# Patient Record
Sex: FEMALE | Race: BLACK OR AFRICAN AMERICAN
[De-identification: names, ages, dates, MRNs, and addresses within clinical notes are randomized per-mention and may not be internally consistent; named-entity substitution may affect disease eponyms.]

---

## 2019-01-21 ENCOUNTER — HOSPITAL ENCOUNTER (EMERGENCY)
Dept: HOSPITAL 41 - JD.ED | Age: 54
Discharge: HOME | End: 2019-01-21
Payer: MEDICAID

## 2019-01-21 DIAGNOSIS — I10: ICD-10-CM

## 2019-01-21 DIAGNOSIS — Z79.899: ICD-10-CM

## 2019-01-21 DIAGNOSIS — G43.909: Primary | ICD-10-CM

## 2019-01-21 DIAGNOSIS — Z79.82: ICD-10-CM

## 2019-01-21 PROCEDURE — 83735 ASSAY OF MAGNESIUM: CPT

## 2019-01-21 PROCEDURE — 99285 EMERGENCY DEPT VISIT HI MDM: CPT

## 2019-01-21 PROCEDURE — 96376 TX/PRO/DX INJ SAME DRUG ADON: CPT

## 2019-01-21 PROCEDURE — 80306 DRUG TEST PRSMV INSTRMNT: CPT

## 2019-01-21 PROCEDURE — 84443 ASSAY THYROID STIM HORMONE: CPT

## 2019-01-21 PROCEDURE — 80053 COMPREHEN METABOLIC PANEL: CPT

## 2019-01-21 PROCEDURE — 85730 THROMBOPLASTIN TIME PARTIAL: CPT

## 2019-01-21 PROCEDURE — 85007 BL SMEAR W/DIFF WBC COUNT: CPT

## 2019-01-21 PROCEDURE — 71045 X-RAY EXAM CHEST 1 VIEW: CPT

## 2019-01-21 PROCEDURE — 36415 COLL VENOUS BLD VENIPUNCTURE: CPT

## 2019-01-21 PROCEDURE — 70450 CT HEAD/BRAIN W/O DYE: CPT

## 2019-01-21 PROCEDURE — 85027 COMPLETE CBC AUTOMATED: CPT

## 2019-01-21 PROCEDURE — 93005 ELECTROCARDIOGRAM TRACING: CPT

## 2019-01-21 PROCEDURE — 85610 PROTHROMBIN TIME: CPT

## 2019-01-21 PROCEDURE — 96374 THER/PROPH/DIAG INJ IV PUSH: CPT

## 2019-01-21 PROCEDURE — 81001 URINALYSIS AUTO W/SCOPE: CPT

## 2019-01-21 PROCEDURE — 96361 HYDRATE IV INFUSION ADD-ON: CPT

## 2019-01-21 PROCEDURE — 96375 TX/PRO/DX INJ NEW DRUG ADDON: CPT

## 2019-01-21 PROCEDURE — 82962 GLUCOSE BLOOD TEST: CPT

## 2019-01-21 NOTE — EDM.PDOC
ED HPI GENERAL MEDICAL PROBLEM





- General


Chief Complaint: Neuro Symptoms/Deficits


Stated Complaint: [facility]


Time Seen by Provider: 01/21/19 16:06


Source of Information: Reports: Patient, Police


History Limitations: Reports: No Limitations





- History of Present Illness


INITIAL COMMENTS - FREE TEXT/NARRATIVE: 





53-year-old female arrives via ambulance service for evaluation treatment of 

right-sided weakness.





Patient is currently at [facility].





The report I got from the police and she began complaining of dizziness around 

1400. They moved her downstairs for closer monitoring. They appreciated that 

she then had speech difficulties which they did not identify as slurred speech 

but more trouble with stuttering and word finding. Patient is complaining of 

right-sided weakness in her arm and leg. She is also complaining of numbness 

and tingling in the right side of her arm and leg. She reports that she has a 

history of an aneurysm, this has never ruptured and has not required any repair 

thus far. Patient reports whenever she coughs she experiences a headache. 

Associated symptoms currently include a headache, dizziness, nausea and 

vomiting. 





Patient reports she has a history of multiple stroke. This was in [place], [place].





EMS reports that she did walk a short distance in front of them. She had a 

shaky gait but did not demonstrate any obvious weakness, right side did not 

give out on her.





- Related Data


 Allergies











Allergy/AdvReac Type Severity Reaction Status Date / Time


 


No Known Allergies Allergy   Verified 01/21/19 16:03











Home Meds: 


 Home Meds





Aspirin [Radford Aspirin EC] 81 mg PO DAILY 01/21/19 [History]


Citalopram Hydrobromide [Celexa] 10 mg PO DAILY 01/21/19 [History]


Cyclobenzaprine [Flexeril] 10 mg PO TID PRN 01/21/19 [History]


Gabapentin [Neurontin] 300 mg PO BID 01/21/19 [History]


Metoprolol Tartrate 25 mg PO BID 01/21/19 [History]


Naproxen 375 mg PO BID 01/21/19 [History]


Omeprazole 40 mg PO DAILY 01/21/19 [History]


Polyethylene Glycol [Polyox Wsr-301] 1 dose PO DAILY PRN 01/21/19 [History]


Pravastatin [Pravachol] 40 mg PO DAILY 01/21/19 [History]


Thiamine [Vitamin B-1] 100 mg PO DAILY 01/21/19 [History]


amLODIPine Besylate [Amlodipine Besylate] 10 mg PO DAILY 01/21/19 [History]


hydrOXYzine HCl [hydrOXYzine] 25 mg PO BEDTIME PRN 01/21/19 [History]











Past Medical History


Cardiovascular History: Reports: Hypertension


Neurological History: Reports: Cerebral Aneurysms, CVA





Social & Family History





- Tobacco Use


Smoking Status *Q: Never Smoker


Second Hand Smoke Exposure: No





- Caffeine Use


Caffeine Use: Reports: None





- Recreational Drug Use


Recreational Drug Use: Yes


Drug Use in Last 12 Months: Yes





ED ROS GENERAL





- Review of Systems


Review Of Systems: See Below


Respiratory: Denies: Shortness of Breath


Cardiovascular: Denies: Chest Pain


GI/Abdominal: Reports: Nausea, Vomiting


Musculoskeletal: Reports: Neck Pain


Neurological: Reports: Dizziness, Headache (with cough), Numbness (right side), 

Tingling (right side), Trouble Speaking (no slurred speech, shaky speech, 

trouble wiht word finding), Difficulty Walking, Weakness (right sided)





ED EXAM, NEURO





- Physical Exam


Exam: See Below


Exam Limited By: No Limitations


General Appearance: Alert, WD/WN, No Apparent Distress


Eye Exam: Bilateral Eye: EOMI, Normal Inspection, PERRL


Ears: Normal External Exam


Nose: Normal Inspection


Throat/Mouth: Normal Inspection, Normal Lips, No Airway Compromise, Other (no 

slurred speech)


Respiratory/Chest: No Respiratory Distress, Lungs Clear, Normal Breath Sounds


Cardiovascular: Normal Peripheral Pulses, Regular Rate, Rhythm, No Murmur


GI/Abdominal: Soft, Non-Tender


Neurological: Alert, Normal Mood/Affect, Normal Dorsiflexion (poor effort ), 

Normal Plantar Flexion (poor effort), Other ( 4/5 bilaterally, questionable 

effort; no slurred speech, trouble wiht word finding and has shaky speech, no 

facial droop, equal sesation on both sides; no pronator drift to arms or legs 

has overall generalized weakness and cannot raise arms or legs on own, drops 

arms and legs immediately when raised for her. )


Extremities: Normal Inspection, Normal Capillary Refill


Psychiatric: Normal Affect, Normal Mood


Skin Exam: Warm, Dry, Normal Color





EKG INTERPRETATION


EKG Date: 01/21/19


Time: 17:00


Rhythm: NSR


Rate (Beats/Min): 105


Axis: Normal


P-Wave: Present


QRS: Normal


ST-T: Normal


QT: Normal


EKG Interpretation Comments: 





sinus tachycardia at 105. + MARCIO. + LAE. No AVB. No ischemic changes. Slight 

early transition. Borderline LAD. No LVH. No IVCD. QTc within normal limits 

with a QTc of 463. Reviewed by myself and Dr. Estrada.





Course





- Vital Signs


Last Recorded V/S: 


 Last Vital Signs











Temp  97.1 F   01/21/19 16:00


 


Pulse  108 H  01/21/19 16:00


 


Resp  18   01/21/19 16:00


 


BP  134/101 H  01/21/19 16:00


 


Pulse Ox  99   01/21/19 16:00














- Orders/Labs/Meds


Labs: 


 Laboratory Tests











  01/21/19 01/21/19 01/21/19 Range/Units





  16:30 16:30 16:30 


 


WBC  12.94 H    (3.98-10.04)  K/mm3


 


RBC  4.76    (3.98-5.22)  M/mm3


 


Hgb  14.2    (11.2-15.7)  gm/L


 


Hct  43.5    (34.1-44.9)  %


 


MCV  91.4    (79.4-94.8)  fl


 


MCH  29.8    (25.6-32.2)  pg


 


MCHC  32.6    (32.2-35.5)  g/dl


 


RDW Std Deviation  43.8    (36.4-46.3)  fL


 


Plt Count  283    (182-369)  K/mm3


 


MPV  9.5    (9.4-12.3)  fl


 


Neutrophils % (Manual)  61 H    (40-60)  %


 


Band Neutrophils %  2    (0-10)  %


 


Lymphocytes % (Manual)  32    (20-40)  %


 


Atypical Lymphs %  0    %


 


Monocytes % (Manual)  5    (2-10)  %


 


Eosinophils % (Manual)  0 L    (0.7-5.8)  %


 


Basophils % (Manual)  0 L    (0.1-1.2)  


 


Platelet Estimate  Adequate    


 


RBC Morph Comment  Normal    


 


PT   10.5   (9.5-12.1)  SECONDS


 


INR   0.96   


 


APTT   28   (24-31)  SECONDS


 


Sodium    141  (136-145)  mEq/L


 


Potassium    3.1 L  (3.5-5.1)  mEq/L


 


Chloride    101  ()  mEq/L


 


Carbon Dioxide    28  (21-32)  mEq/L


 


Anion Gap    15.1 H  (5-15)  


 


BUN    15  (7-18)  mg/dL


 


Creatinine    0.8  (0.55-1.02)  mg/dL


 


Est Cr Clr Drug Dosing    65.73  mL/min


 


Estimated GFR (MDRD)    > 60  (>60)  mL/min


 


BUN/Creatinine Ratio    18.8 H  (14-18)  


 


Glucose    114 H  ()  mg/dL


 


POC Glucose     ()  mg/dL


 


Calcium    10.2 H  (8.5-10.1)  mg/dL


 


Magnesium     (1.8-2.4)  mg/dl


 


Total Bilirubin    0.5  (0.2-1.0)  mg/dL


 


AST    18  (15-37)  U/L


 


ALT    31  (14-59)  U/L


 


Alkaline Phosphatase    94  ()  U/L


 


Total Protein    8.6 H  (6.4-8.2)  g/dl


 


Albumin    4.7  (3.4-5.0)  g/dl


 


Globulin    3.9  gm/dL


 


Albumin/Globulin Ratio    1.2  (1-2)  


 


TSH 3rd Generation     (0.358-3.74)  uIU/mL


 


Urine Color     (Yellow)  


 


Urine Appearance     (Clear)  


 


Urine pH     (5.0-8.0)  


 


Ur Specific Gravity     (1.005-1.030)  


 


Urine Protein     (Negative)  


 


Urine Glucose (UA)     (Negative)  


 


Urine Ketones     (Negative)  


 


Urine Occult Blood     (Negative)  


 


Urine Nitrite     (Negative)  


 


Urine Bilirubin     (Negative)  


 


Urine Urobilinogen     (0.2-1.0)  


 


Ur Leukocyte Esterase     (Negative)  


 


Urine RBC     (0-5)  /hpf


 


Urine WBC     (0-5)  /hpf


 


Ur Epithelial Cells     (0-5)  /hpf


 


Urine Bacteria     (FEW)  /hpf


 


Urine Mucus     (FEW)  /hpf


 


Urine Opiates Screen     (DOILDV=925)  


 


Ur Buprenorphine Scrn     (CUTOFF=10)  


 


Ur Oxycodone Screen     (WTL5ZG=031)  


 


Urine Methadone Screen     (WBUMNW=203)  


 


Ur Propoxyphene Screen     (MYEIYJ=645)  


 


Ur Barbiturates Screen     (EOYOPB=820)  


 


Ur Tricyclics Screen     (HJZBJJ=712)  


 


Ur Phencyclidine Scrn     (CUTOFF=25)  


 


Ur Amphetamine Screen     (TAXYAK=171)  


 


U Methamphetamines Scrn     (UQZIQO=040)  


 


U Benzodiazepines Scrn     (QGZXBL=038)  


 


U Cocaine Metab Screen     (JJPKRP=543)  


 


U Marijuana (THC) Screen     (CUTOFF=50)  














  01/21/19 01/21/19 01/21/19 Range/Units





  16:30 16:49 17:54 


 


WBC     (3.98-10.04)  K/mm3


 


RBC     (3.98-5.22)  M/mm3


 


Hgb     (11.2-15.7)  gm/L


 


Hct     (34.1-44.9)  %


 


MCV     (79.4-94.8)  fl


 


MCH     (25.6-32.2)  pg


 


MCHC     (32.2-35.5)  g/dl


 


RDW Std Deviation     (36.4-46.3)  fL


 


Plt Count     (182-369)  K/mm3


 


MPV     (9.4-12.3)  fl


 


Neutrophils % (Manual)     (40-60)  %


 


Band Neutrophils %     (0-10)  %


 


Lymphocytes % (Manual)     (20-40)  %


 


Atypical Lymphs %     %


 


Monocytes % (Manual)     (2-10)  %


 


Eosinophils % (Manual)     (0.7-5.8)  %


 


Basophils % (Manual)     (0.1-1.2)  


 


Platelet Estimate     


 


RBC Morph Comment     


 


PT     (9.5-12.1)  SECONDS


 


INR     


 


APTT     (24-31)  SECONDS


 


Sodium     (136-145)  mEq/L


 


Potassium     (3.5-5.1)  mEq/L


 


Chloride     ()  mEq/L


 


Carbon Dioxide     (21-32)  mEq/L


 


Anion Gap     (5-15)  


 


BUN     (7-18)  mg/dL


 


Creatinine     (0.55-1.02)  mg/dL


 


Est Cr Clr Drug Dosing     mL/min


 


Estimated GFR (MDRD)     (>60)  mL/min


 


BUN/Creatinine Ratio     (14-18)  


 


Glucose     ()  mg/dL


 


POC Glucose   111 H   ()  mg/dL


 


Calcium     (8.5-10.1)  mg/dL


 


Magnesium  2.2    (1.8-2.4)  mg/dl


 


Total Bilirubin     (0.2-1.0)  mg/dL


 


AST     (15-37)  U/L


 


ALT     (14-59)  U/L


 


Alkaline Phosphatase     ()  U/L


 


Total Protein     (6.4-8.2)  g/dl


 


Albumin     (3.4-5.0)  g/dl


 


Globulin     gm/dL


 


Albumin/Globulin Ratio     (1-2)  


 


TSH 3rd Generation  1.808    (0.358-3.74)  uIU/mL


 


Urine Color    Yellow  (Yellow)  


 


Urine Appearance    Clear  (Clear)  


 


Urine pH    7.0  (5.0-8.0)  


 


Ur Specific Gravity    1.020  (1.005-1.030)  


 


Urine Protein    1+ H  (Negative)  


 


Urine Glucose (UA)    Negative  (Negative)  


 


Urine Ketones    2+ H  (Negative)  


 


Urine Occult Blood    Negative  (Negative)  


 


Urine Nitrite    Negative  (Negative)  


 


Urine Bilirubin    Negative  (Negative)  


 


Urine Urobilinogen    2.0 H  (0.2-1.0)  


 


Ur Leukocyte Esterase    Negative  (Negative)  


 


Urine RBC    0-5  (0-5)  /hpf


 


Urine WBC    0-5  (0-5)  /hpf


 


Ur Epithelial Cells    5-10 H  (0-5)  /hpf


 


Urine Bacteria    Few  (FEW)  /hpf


 


Urine Mucus    Few  (FEW)  /hpf


 


Urine Opiates Screen     (QNRMUA=505)  


 


Ur Buprenorphine Scrn     (CUTOFF=10)  


 


Ur Oxycodone Screen     (XPS3OW=724)  


 


Urine Methadone Screen     (GGWUXC=274)  


 


Ur Propoxyphene Screen     (UDYBKL=279)  


 


Ur Barbiturates Screen     (MCBFTO=461)  


 


Ur Tricyclics Screen     (QANNFX=007)  


 


Ur Phencyclidine Scrn     (CUTOFF=25)  


 


Ur Amphetamine Screen     (IALTHA=820)  


 


U Methamphetamines Scrn     (NRLEXS=026)  


 


U Benzodiazepines Scrn     (UEURVX=328)  


 


U Cocaine Metab Screen     (FPCRFM=450)  


 


U Marijuana (THC) Screen     (CUTOFF=50)  














  01/21/19 Range/Units





  17:54 


 


WBC   (3.98-10.04)  K/mm3


 


RBC   (3.98-5.22)  M/mm3


 


Hgb   (11.2-15.7)  gm/L


 


Hct   (34.1-44.9)  %


 


MCV   (79.4-94.8)  fl


 


MCH   (25.6-32.2)  pg


 


MCHC   (32.2-35.5)  g/dl


 


RDW Std Deviation   (36.4-46.3)  fL


 


Plt Count   (182-369)  K/mm3


 


MPV   (9.4-12.3)  fl


 


Neutrophils % (Manual)   (40-60)  %


 


Band Neutrophils %   (0-10)  %


 


Lymphocytes % (Manual)   (20-40)  %


 


Atypical Lymphs %   %


 


Monocytes % (Manual)   (2-10)  %


 


Eosinophils % (Manual)   (0.7-5.8)  %


 


Basophils % (Manual)   (0.1-1.2)  


 


Platelet Estimate   


 


RBC Morph Comment   


 


PT   (9.5-12.1)  SECONDS


 


INR   


 


APTT   (24-31)  SECONDS


 


Sodium   (136-145)  mEq/L


 


Potassium   (3.5-5.1)  mEq/L


 


Chloride   ()  mEq/L


 


Carbon Dioxide   (21-32)  mEq/L


 


Anion Gap   (5-15)  


 


BUN   (7-18)  mg/dL


 


Creatinine   (0.55-1.02)  mg/dL


 


Est Cr Clr Drug Dosing   mL/min


 


Estimated GFR (MDRD)   (>60)  mL/min


 


BUN/Creatinine Ratio   (14-18)  


 


Glucose   ()  mg/dL


 


POC Glucose   ()  mg/dL


 


Calcium   (8.5-10.1)  mg/dL


 


Magnesium   (1.8-2.4)  mg/dl


 


Total Bilirubin   (0.2-1.0)  mg/dL


 


AST   (15-37)  U/L


 


ALT   (14-59)  U/L


 


Alkaline Phosphatase   ()  U/L


 


Total Protein   (6.4-8.2)  g/dl


 


Albumin   (3.4-5.0)  g/dl


 


Globulin   gm/dL


 


Albumin/Globulin Ratio   (1-2)  


 


TSH 3rd Generation   (0.358-3.74)  uIU/mL


 


Urine Color   (Yellow)  


 


Urine Appearance   (Clear)  


 


Urine pH   (5.0-8.0)  


 


Ur Specific Gravity   (1.005-1.030)  


 


Urine Protein   (Negative)  


 


Urine Glucose (UA)   (Negative)  


 


Urine Ketones   (Negative)  


 


Urine Occult Blood   (Negative)  


 


Urine Nitrite   (Negative)  


 


Urine Bilirubin   (Negative)  


 


Urine Urobilinogen   (0.2-1.0)  


 


Ur Leukocyte Esterase   (Negative)  


 


Urine RBC   (0-5)  /hpf


 


Urine WBC   (0-5)  /hpf


 


Ur Epithelial Cells   (0-5)  /hpf


 


Urine Bacteria   (FEW)  /hpf


 


Urine Mucus   (FEW)  /hpf


 


Urine Opiates Screen  Negative  (SPPXSR=463)  


 


Ur Buprenorphine Scrn  Negative  (CUTOFF=10)  


 


Ur Oxycodone Screen  Negative  (USD9SW=829)  


 


Urine Methadone Screen  Negative  (UGYGML=643)  


 


Ur Propoxyphene Screen  Negative  (PSRABG=437)  


 


Ur Barbiturates Screen  Negative  (AKAHRV=564)  


 


Ur Tricyclics Screen  Presumptive positive H  (ZOTFGU=363)  


 


Ur Phencyclidine Scrn  Negative  (CUTOFF=25)  


 


Ur Amphetamine Screen  Negative  (YOEDRR=858)  


 


U Methamphetamines Scrn  Negative  (DRZVEE=620)  


 


U Benzodiazepines Scrn  Negative  (BFOFHW=193)  


 


U Cocaine Metab Screen  Negative  (JSSVIU=874)  


 


U Marijuana (THC) Screen  Presumptive positive H  (CUTOFF=50)  











Meds: 


Medications














Discontinued Medications














Generic Name Dose Route Start Last Admin





  Trade Name Freq  PRN Reason Stop Dose Admin


 


Diphenhydramine HCl  50 mg  01/21/19 19:21  01/21/19 19:30





  Benadryl  IVPUSH  01/21/19 19:22  50 mg





  ONETIME ONE   Administration





     





     





     





     


 


Haloperidol Lactate  2.5 mg  01/21/19 19:21  01/21/19 19:33





  Haldol  IVPUSH  01/21/19 19:22  2.5 mg





  ONETIME ONE   Administration





     





     





     





     


 


Sodium Chloride  1,000 mls @ 100 mls/hr  01/21/19 16:16  01/21/19 16:49





  Normal Saline  IV  01/22/19 02:15  100 mls/hr





  ONETIME ONE   Administration





     





     





     





     


 


Ketorolac Tromethamine  30 mg  01/21/19 17:47  01/21/19 18:08





  Toradol  IVPUSH  01/21/19 17:48  30 mg





  ONETIME ONE   Administration





     





     





     





     


 


Ondansetron HCl  4 mg  01/21/19 16:43  01/21/19 16:49





  Zofran  IVPUSH  01/21/19 16:44  4 mg





  ONETIME ONE   Administration





     





     





     





     


 


Ondansetron HCl  4 mg  01/21/19 19:21  01/21/19 19:28





  Zofran  IVPUSH  01/21/19 19:22  4 mg





  ONETIME ONE   Administration





     





     





     





     


 


Sodium Chloride  10 ml  01/21/19 16:17  01/21/19 16:50





  Saline Flush  FLUSH   10 ml





  ASDIRECTED PRN   Administration





  Keep Vein Open   





     





     





     














- Radiology Interpretation


Free Text/Narrative:: 





Head CT


Technique: Multiple axial sections through the brain were obtained.  

Intravenous contrast was not utilized.


Comparison: No previous intracranial imaging is available.


Findings: Ventricles along with basal cisterns and sulci over the convexities 

are within normal limits for the patient's age.  No abnormal parenchymal 

densities are seen.  No evidence of intracranial hemorrhage.  No midline shift 

or mass effect is seen.


Bone window settings were reviewed which shows no acute calvarial abnormality.  

Visualized sinuses are clear.


Impression:


1.  Nothing acute is appreciated on noncontrast head CT study.





Chest: Portable view of the chest was obtained.


Comparison: No previous chest x-ray.


Heart size is normal.  Upper mediastinum is normal.  Scarring or discoid 

atelectasis is seen within the left mid to lower lung.  Lungs otherwise are 

clear.  Bony structures are grossly intact.


Impression:


1.  Linear scarring or discoid atelectasis within the left mid to lower lung.


2.  Nothing acute is otherwise seen on portable chest x-ray.





- Re-Assessments/Exams


Free Text/Narrative Re-Assessment/Exam: 





01/21/19 20:08


I do not believe the patient is having a CVA. No signs of old CVA on Ct scan. 

Likely migraine and/or vertigo. NIHSS score reported as 9 but actually does not 

have drift of extremities on one side, she has generalized weakness to both 

extremities. Upon further discussion with the patient the right sided weakness 

she was complainong of is chronic from old CVA. I did not appreciate any right 

sisded weakness on exam, only generalized weakness.





I asked Dr. Estrada to see the patient. He has seen the patient and agrees this 

is not a CVA. Likely an atypical migraine as she is now complaining more of a 

headache and less of numbness, tingling weakness. 





Headache is improving after medications. Shaky speech and trouble with word 

finding resolved. Reprots numbness and tinglign nearly resolved. 





Will discharge home. Police have dropped charges against the patient. 





Discharge instructions as documented. 





Departure





- Departure


Time of Disposition: 20:14


Disposition: Home, Self-Care 01


Condition: Good


Clinical Impression: 


 Migraine








- Discharge Information


*PRESCRIPTION DRUG MONITORING PROGRAM REVIEWED*: No


*COPY OF PRESCRIPTION DRUG MONITORING REPORT IN PATIENT RHONDA: No


Instructions:  Migraine Headache


Referrals: 


PCP,None [Ordering Only Provider] - 


Forms:  ED Department Discharge


Additional Instructions: 


Rest.





Drink plenty of fluids. 





OTC tylenol or motrin as needed for pain. 





Follow-up with family medicine within 2 weeks for a recheck of your symptoms. 

Here [facility] recommend Dr. Montes. Call [phone] to schedule with her. 





Please return to the ER should your symptoms change or worsen.

## 2019-01-21 NOTE — CR
Chest: Portable view of the chest was obtained.

 

Comparison: No previous chest x-ray.

 

Heart size is normal.  Upper mediastinum is normal.  Scarring or 

discoid atelectasis is seen within the left mid to lower lung.  Lungs 

otherwise are clear.  Bony structures are grossly intact.

 

Impression:

1.  Linear scarring or discoid atelectasis within the left mid to 

lower lung.

2.  Nothing acute is otherwise seen on portable chest x-ray.

 

Diagnostic code #2

## 2019-01-21 NOTE — CT
Head CT

 

Technique: Multiple axial sections through the brain were obtained.  

Intravenous contrast was not utilized.

 

Comparison: No previous intracranial imaging is available.

 

Findings: Ventricles along with basal cisterns and sulci over the 

convexities are within normal limits for the patient's age.  No 

abnormal parenchymal densities are seen.  No evidence of intracranial 

hemorrhage.  No midline shift or mass effect is seen.

 

Bone window settings were reviewed which shows no acute calvarial 

abnormality.  Visualized sinuses are clear.

 

Impression:

1.  Nothing acute is appreciated on noncontrast head CT study.

 

Diagnostic code #1